# Patient Record
Sex: MALE | Race: WHITE | NOT HISPANIC OR LATINO | Employment: FULL TIME | URBAN - METROPOLITAN AREA
[De-identification: names, ages, dates, MRNs, and addresses within clinical notes are randomized per-mention and may not be internally consistent; named-entity substitution may affect disease eponyms.]

---

## 2019-07-18 ENCOUNTER — HISTORICAL (OUTPATIENT)
Dept: ADMINISTRATIVE | Facility: HOSPITAL | Age: 39
End: 2019-07-18

## 2022-04-10 ENCOUNTER — HISTORICAL (OUTPATIENT)
Dept: ADMINISTRATIVE | Facility: HOSPITAL | Age: 42
End: 2022-04-10

## 2022-04-27 VITALS
DIASTOLIC BLOOD PRESSURE: 75 MMHG | BODY MASS INDEX: 39.04 KG/M2 | HEIGHT: 71 IN | WEIGHT: 278.88 LBS | SYSTOLIC BLOOD PRESSURE: 129 MMHG | OXYGEN SATURATION: 97 %

## 2022-05-04 NOTE — HISTORICAL [facility]
This is a historical note converted from [employer]. Formatting and pictures may have been removed.  Please reference Petr for original formatting and attached multimedia. Chief Complaint  Lt foot pain x 2 -3 weeks  History of Present Illness  Patient is a 38-year-old male presenting with?left foot for about 2 to 3 weeks patient did?admit to?walking on a pallet and injured his foot?pain, waxed and waned for the past 2 to 3 weeks?but nothing debilitating at this time.? Patient also admits to being?overweight and has?arch support problems?for the past several years.  Review of Systems  Constitutional_no fever, fatigue, weakness  Cardiovascular_no chest pain, tachycardia, bradycardia, arrhythmia  Respiratory_no shortness of breath, cough, wheezing, rhonchi  Musculoskeletal_left foot pain?dorsal and?arches  Integumentary_no skin rash or abnormal lesion  Neurologic_no headache, no dizziness, no weakness or numbness  ?  Physical Exam  Vitals & Measurements  T:?37.0? ?C (Oral)? HR:?96(Peripheral)? RR:?18? BP:?129/75? SpO2:?97%?  HT:?180?cm? WT:?126.5?kg? BMI:?39.04?  VITAL SIGNS: ?Reviewed. ? ?  GENERAL:? In no apparent distress.?  CHEST:? Chest with clear breath sounds bilaterally.? No wheezes, rales, or rhonchi.?  CARDIAC:? Regular rate and rhythm.? S1 and S2, without murmurs, gallops, or rubs.  ABDOMEN:? Soft, without detectable tenderness.? No sign of distention.? No?? rebound or guarding, and no masses palpated.?? Bowel Sounds normal.  MUSCULOSKELETAL: Mid foot pain radiating to his arches?no pain with walking barefoot?pain worsens as the day progresses on. ?Patient has good?plantar dorsal?flexion contraction. ?Patient has good pedal pulses +2.??Circulation sensation present no paresthesias present.  NEUROLOGIC EXAM:? Alert and oriented x 3.? No focal sensory or strength deficits.?? Speech normal.? Follows commands.  Assessment/Plan  1.?Left foot pain?M79.672  Ordered:  diclofenac, 75 mg = 1 tab(s), Oral, BID, # 20  tab(s), 0 Refill(s), Pharmacy: [id number]  Office/Outpatient Visit Level 3 New [id number] PC, Left foot pain, [facility], 07/18/19 18:42:00 CDT  XR Foot Left Minimum 3 Views, Routine, 07/18/19 18:28:00 CDT, Pain, None, Ambulatory, Rad Type, Left foot pain, Not Scheduled, 07/18/19 18:28:00 CDT  ?  Instructed?patient start diclofenac 75 mg p.o. x7 to 10 days.  Instructed patient?to get arch supports?on hard plastic quality support?were delivered shoe?if symptoms persist follow-up with Dr. Kenneth Urbano?at LOS.   Problem List/Past Medical History  Ongoing  Obesity  Tobacco user  Historical  No qualifying data  Procedure/Surgical History  Elbow  Shoulder  Barry Teeth   Medications  diclofenac sodium 75 mg oral delayed release tablet, 75 mg= 1 tab(s), Oral, BID  Allergies  sulfa drugs?(Uncontrollable vomiting)  Social History  Abuse/Neglect  No, 07/18/2019  Tobacco  Never (less than 100 in lifetime), Oral, N/A, Smokeless Tobacco Use: Smokeless tobacco user within last 30 days., 07/18/2019  Family History  Family history is negative  Health Maintenance  Health Maintenance  ???Pending?(in the next year)  ??? ??OverDue  ??? ? ? ?Alcohol Misuse Screening due??01/01/19??and every 1??year(s)  ??? ? ? ?Smoking Cessation due??01/01/19??and every 1??year(s)  ??? ??Due?  ??? ? ? ?ADL Screening due??07/18/19??and every 1??year(s)  ??? ? ? ?Tetanus Vaccine due??07/18/19??and every 10??year(s)  ??? ??Due In Future?  ??? ? ? ?Obesity Screening not due until??01/01/20??and every 1??year(s)  ???Satisfied?(in the past 1 year)  ??? ??Satisfied?  ??? ? ? ?Blood Pressure Screening on??07/18/19.??Satisfied by Latanya Hickey LPN  ??? ? ? ?Body Mass Index Check on??07/18/19.??Satisfied by Latanya Hickey LPN  ??? ? ? ?Obesity Screening on??07/18/19.??Satisfied by Latanya Hickey LPN  ?

## 2024-01-31 ENCOUNTER — OFFICE VISIT (OUTPATIENT)
Dept: URGENT CARE | Facility: CLINIC | Age: 44
End: 2024-01-31
Payer: COMMERCIAL

## 2024-01-31 VITALS
TEMPERATURE: 99 F | HEART RATE: 110 BPM | SYSTOLIC BLOOD PRESSURE: 134 MMHG | DIASTOLIC BLOOD PRESSURE: 89 MMHG | OXYGEN SATURATION: 97 % | RESPIRATION RATE: 18 BRPM

## 2024-01-31 DIAGNOSIS — U07.1 COVID: Primary | ICD-10-CM

## 2024-01-31 DIAGNOSIS — R10.9 STOMACH PAIN: ICD-10-CM

## 2024-01-31 DIAGNOSIS — B00.1 FEVER BLISTER: ICD-10-CM

## 2024-01-31 LAB
CTP QC/QA: YES
CTP QC/QA: YES
POC MOLECULAR INFLUENZA A AGN: NEGATIVE
POC MOLECULAR INFLUENZA B AGN: NEGATIVE
SARS-COV-2 RDRP RESP QL NAA+PROBE: POSITIVE

## 2024-01-31 PROCEDURE — 87635 SARS-COV-2 COVID-19 AMP PRB: CPT | Mod: QW,,, | Performed by: FAMILY MEDICINE

## 2024-01-31 PROCEDURE — 87502 INFLUENZA DNA AMP PROBE: CPT | Mod: QW,,, | Performed by: FAMILY MEDICINE

## 2024-01-31 PROCEDURE — 99203 OFFICE O/P NEW LOW 30 MIN: CPT | Mod: ,,, | Performed by: FAMILY MEDICINE

## 2024-01-31 RX ORDER — VALACYCLOVIR HYDROCHLORIDE 1 G/1
2000 TABLET, FILM COATED ORAL 2 TIMES DAILY
Qty: 4 TABLET | Refills: 0 | Status: SHIPPED | OUTPATIENT
Start: 2024-01-31 | End: 2024-02-01

## 2024-01-31 NOTE — PROGRESS NOTES
Subjective:      Patient ID: Crescencio Roland is a 43 y.o. male.    Vitals:  temperature is 99.1 °F (37.3 °C). His blood pressure is 134/89 and his pulse is 110. His respiration is 18 and oxygen saturation is 97%.     Chief Complaint: Abdominal Pain (Stomach pain, blister on lip, burning sensation in nose x last night benadryl mild relief/Exposure to covid /Denies cough, NC, ST, fever/)    43-year-old male presents to clinic complaining of a one-day history of upset stomach diarrhea burning sensation in the nose and also developed a fever blister on the lower lip.  States he was exposed to someone with COVID.  Denies any fever shortness of breath or vomiting.        Constitution: Negative.   HENT: Negative.     Neck: neck negative.   Cardiovascular: Negative.    Eyes: Negative.    Respiratory: Negative.     Gastrointestinal:  Positive for diarrhea.   Genitourinary: Negative.    Musculoskeletal: Negative.    Skin: Negative.    Allergic/Immunologic: Negative.    Neurological: Negative.    Hematologic/Lymphatic: Negative.       Objective:     Physical Exam   Constitutional: He is oriented to person, place, and time. He appears well-developed. He is cooperative.  Non-toxic appearance. He does not appear ill. No distress.   HENT:   Head: Normocephalic and atraumatic.   Ears:   Right Ear: Hearing and external ear normal.   Left Ear: Hearing and external ear normal.   Mouth/Throat: Mucous membranes are normal.   Eyes: Conjunctivae and lids are normal.   Neck: Trachea normal and phonation normal. Neck supple. No edema present. No erythema present. No neck rigidity present.   Cardiovascular: Normal rate, regular rhythm and normal heart sounds.   Pulmonary/Chest: Effort normal and breath sounds normal. No stridor. No respiratory distress. He has no decreased breath sounds. He has no wheezes. He has no rhonchi. He has no rales.   Abdominal: Normal appearance.   Neurological: He is alert and oriented to person, place, and time. He  exhibits normal muscle tone.   Skin: Skin is warm, intact and not diaphoretic.   Psychiatric: His speech is normal and behavior is normal. Mood, judgment and thought content normal.   Nursing note and vitals reviewed.         Previous History      Review of patient's allergies indicates:   Allergen Reactions    Sulfa (sulfonamide antibiotics) Nausea And Vomiting       History reviewed. No pertinent past medical history.  Current Outpatient Medications   Medication Instructions    nirmatrelvir-ritonavir 300 mg (150 mg x 2)-100 mg copackaged tablets (EUA) Take 3 tablets by mouth 2 (two) times daily. Each dose contains 2 nirmatrelvir (pink tablets) and 1 ritonavir (white tablet). Take all 3 tablets together    valACYclovir (VALTREX) 2,000 mg, Oral, 2 times daily     Past Surgical History:   Procedure Laterality Date    WISDOM TOOTH EXTRACTION       History reviewed. No pertinent family history.    Social History     Tobacco Use    Smoking status: Never    Smokeless tobacco: Never   Substance Use Topics    Alcohol use: Not Currently        Physical Exam      Vital Signs Reviewed   /89   Pulse 110   Temp 99.1 °F (37.3 °C)   Resp 18   SpO2 97%        Procedures    Procedures     Labs     Results for orders placed or performed in visit on 01/31/24   POCT COVID-19 Rapid Screening   Result Value Ref Range    POC Rapid COVID Positive (A) Negative     Acceptable Yes        Assessment:     1. COVID    2. Stomach pain    3. Fever blister        Plan:   COVID Positive  Medications sent to pharmacy  Start multi vitamin daily. Current CDC guidelines recommend that you quarantine for 5 days starting the day after your symptoms began. Quarantine can end after 5 days as long as the last 24 hours of quarantine you are fever free and there is improvement of all your symptoms. Wear a mask around others for 5 additional days after quarantine. Treat your symptoms as you would the common cold. If you live with  anybody, isolate yourself in a separate bedroom and use a separate bathroom. If your symptoms worsen or you develop shortness of breath or a fever over 103, seek medical attention immediately.       COVID    Stomach pain  -     POCT COVID-19 Rapid Screening  -     POCT Influenza A/B Molecular    Fever blister    Other orders  -     nirmatrelvir-ritonavir 300 mg (150 mg x 2)-100 mg copackaged tablets (EUA); Take 3 tablets by mouth 2 (two) times daily. Each dose contains 2 nirmatrelvir (pink tablets) and 1 ritonavir (white tablet). Take all 3 tablets together  Dispense: 30 tablet; Refill: 0  -     valACYclovir (VALTREX) 1000 MG tablet; Take 2 tablets (2,000 mg total) by mouth 2 (two) times daily. for 2 doses  Dispense: 4 tablet; Refill: 0

## 2024-01-31 NOTE — PATIENT INSTRUCTIONS
Plan:   COVID Positive  Medications sent to pharmacy  Start multi vitamin daily. Current CDC guidelines recommend that you quarantine for 5 days starting the day after your symptoms began. Quarantine can end after 5 days as long as the last 24 hours of quarantine you are fever free and there is improvement of all your symptoms. Wear a mask around others for 5 additional days after quarantine. Treat your symptoms as you would the common cold. If you live with anybody, isolate yourself in a separate bedroom and use a separate bathroom. If your symptoms worsen or you develop shortness of breath or a fever over 103, seek medical attention immediately.